# Patient Record
Sex: MALE | Race: WHITE | Employment: FULL TIME | URBAN - METROPOLITAN AREA
[De-identification: names, ages, dates, MRNs, and addresses within clinical notes are randomized per-mention and may not be internally consistent; named-entity substitution may affect disease eponyms.]

---

## 2017-10-03 LAB
CHOLESTEROL, TOTAL: 200 MG/DL
CHOLESTEROL/HDL RATIO: ABNORMAL
HDLC SERPL-MCNC: 75 MG/DL (ref 35–70)
LDL CHOLESTEROL CALCULATED: 65 MG/DL (ref 0–160)
NONHDLC SERPL-MCNC: ABNORMAL MG/DL
TRIGL SERPL-MCNC: 205 MG/DL
VLDLC SERPL CALC-MCNC: ABNORMAL MG/DL

## 2020-06-08 PROBLEM — Z79.4 TYPE 2 DIABETES MELLITUS WITH DIABETIC POLYNEUROPATHY, WITH LONG-TERM CURRENT USE OF INSULIN (HCC): Status: ACTIVE | Noted: 2020-06-08

## 2021-01-06 PROBLEM — R53.83 OTHER FATIGUE: Status: ACTIVE | Noted: 2021-01-06

## 2022-04-21 PROBLEM — R26.2 DIFFICULTY WALKING: Status: ACTIVE | Noted: 2022-04-21

## 2023-03-13 ENCOUNTER — PROCEDURE VISIT (OUTPATIENT)
Age: 63
End: 2023-03-13

## 2023-03-13 DIAGNOSIS — F41.9 ANXIETY: Primary | ICD-10-CM

## 2023-03-15 ENCOUNTER — CLINICAL DOCUMENTATION (OUTPATIENT)
Age: 63
End: 2023-03-15

## 2023-03-16 ENCOUNTER — PROCEDURE VISIT (OUTPATIENT)
Age: 63
End: 2023-03-16

## 2023-03-16 DIAGNOSIS — I10 ESSENTIAL HYPERTENSION: Primary | ICD-10-CM

## 2023-03-16 DIAGNOSIS — E10.9 TYPE 1 DIABETES MELLITUS WITHOUT COMPLICATION (HCC): Primary | ICD-10-CM

## 2023-04-14 ENCOUNTER — PROCEDURE VISIT (OUTPATIENT)
Age: 63
End: 2023-04-14

## 2023-04-14 DIAGNOSIS — E11.9 TYPE 2 DIABETES MELLITUS WITHOUT COMPLICATION, WITH LONG-TERM CURRENT USE OF INSULIN (HCC): Primary | ICD-10-CM

## 2023-04-14 DIAGNOSIS — Z79.4 TYPE 2 DIABETES MELLITUS WITHOUT COMPLICATION, WITH LONG-TERM CURRENT USE OF INSULIN (HCC): Primary | ICD-10-CM

## 2023-04-28 ENCOUNTER — PROCEDURE VISIT (OUTPATIENT)
Age: 63
End: 2023-04-28

## 2023-04-28 DIAGNOSIS — F02.A18 MILD EARLY ONSET ALZHEIMER'S DEMENTIA WITH OTHER BEHAVIORAL DISTURBANCE (HCC): Primary | ICD-10-CM

## 2023-04-28 DIAGNOSIS — G30.0 MILD EARLY ONSET ALZHEIMER'S DEMENTIA WITH OTHER BEHAVIORAL DISTURBANCE (HCC): Primary | ICD-10-CM

## 2023-04-28 DIAGNOSIS — F41.9 ANXIETY: ICD-10-CM

## 2023-05-02 ENCOUNTER — PROCEDURE VISIT (OUTPATIENT)
Age: 63
End: 2023-05-02

## 2023-05-03 ENCOUNTER — CARE COORDINATION (OUTPATIENT)
Dept: CARE COORDINATION | Facility: CLINIC | Age: 63
End: 2023-05-03

## 2023-05-03 DIAGNOSIS — I50.41 ACUTE COMBINED SYSTOLIC AND DIASTOLIC CONGESTIVE HEART FAILURE (HCC): Primary | ICD-10-CM

## 2023-05-03 DIAGNOSIS — R53.83 OTHER FATIGUE: Primary | ICD-10-CM

## 2023-05-03 NOTE — CARE COORDINATION
Remote Patient Monitoring Welcome Note  Date/Time:  5/3/2023 4:27 PM  Patient Current Location: Home: This Is An [place] Test [place]  [id number] [address] [address]  Verified patients name and  as identifiers. Completed and confirmed the following:   Emergency Contact: Javy daughter  [x] Patient received all RPM equipment (tablet, scale, blood pressure device and cuff, and pulse oximeter)  Cuff Size: regular (9.05\"-15.74\")    Weight Scale:  regular (<330lbs)                    [x] Instructed patient keep box for use when returning equipment                                                          [x] Reviewed Patient Welcome Letter with patient                         [x] Reviewed expectations for patient and care team  Monitoring hours M-F 9-4pm  Completing monitoring by 12pm on  so that alerts can be responded to in the same day  Patient weighs self at same time every day (or after urinating and waking up)  Take blood pressure 1-2 hrs after medications   RPM team may have different phone area code (including [place], [place], [facility] [address] or [address])                              [x] Instructed patient to keep scale on flat surface                                                         [x] Instructed patient to keep tablet plugged in at all times                         [x] Instructed how to contact IT support ([phone])  [x] Provided Remote Patient Monitoring care  information               All questions answered at this time.

## 2023-05-08 PROBLEM — E11.9 DIABETES (HCC): Status: ACTIVE | Noted: 2017-02-17

## 2023-05-08 NOTE — PROGRESS NOTES
Erwin Perez (: 1960) is a 65 y.o. male, Established patient patient, Virtual Visit for evaluation of the following chief complaint(s):   No chief complaint on file. ASSESSMENT/PLAN:  1. Mild early onset Alzheimer's dementia with other behavioral disturbance ([facility] [address]  2. Anxiety    Return in about 4 weeks (around 2023) for follow up F2F in 4 weeks. SUBJECTIVE/OBJECTIVE:    Review of Systems     Patient-Reported Vitals  No data recorded     Physical Exam    On this date 2023 I have spent 1 minutes reviewing previous notes, test results and face to face (virtual) with the patient discussing the diagnosis and importance of compliance with the treatment plan as well as documenting on the day of the visit. Erwin Perez, was evaluated through a synchronous (real-time) audio-video encounter. The patient (or guardian if applicable) is aware that this is a billable service, which includes applicable co-pays. This Virtual Visit was conducted with patient's (and/or legal guardian's) consent. Patient identification was verified, and a caregiver was present when appropriate. The patient was located at Home: This Is An Epic Test [place]  [address] [address] [facility]  Provider was located at Home ([facility]): [place]       Patient identification was verified at the start of the visit: yes    Services were provided through a phone synchronous discussion virtually to substitute for in-person clinic visit. Patient was located at home and provider was located in office or at home. An electronic signature was used to authenticate this note.   -- Sonya Christian MD

## 2023-05-18 ENCOUNTER — PROCEDURE VISIT (OUTPATIENT)
Age: 63
End: 2023-05-18

## 2023-05-18 DIAGNOSIS — Z02.0 SCHOOL PHYSICAL EXAM: Primary | ICD-10-CM

## 2023-07-03 PROBLEM — R26.2 DIFFICULTY WALKING: Status: RESOLVED | Noted: 2022-04-21 | Resolved: 2023-07-03

## 2023-11-23 LAB
COLONOSCOPY, EXTERNAL: NORMAL
HBA1C MFR BLD HPLC: 5.8 %

## 2023-11-30 PROBLEM — R05.3 CHRONIC COUGH: Status: ACTIVE | Noted: 2023-11-30

## 2024-01-26 ENCOUNTER — PROCEDURE VISIT (OUTPATIENT)
Dept: PRIMARY CARE CLINIC | Age: 64
End: 2024-01-26

## 2024-01-26 DIAGNOSIS — R33.8 ACUTE URINARY RETENTION: Primary | ICD-10-CM

## 2024-02-12 ENCOUNTER — TELEPHONE (OUTPATIENT)
Dept: ADMINISTRATIVE | Age: 64
End: 2024-02-12

## 2024-02-26 ENCOUNTER — HOSPITAL ENCOUNTER (OUTPATIENT)
Dept: GENERAL RADIOLOGY | Age: 64
Discharge: HOME | End: 2024-02-26
Attending: INTERNAL MEDICINE

## 2024-02-26 DIAGNOSIS — R07.9 CHEST PAIN, UNSPECIFIED TYPE: ICD-10-CM

## 2024-02-27 ENCOUNTER — HOSPITAL ENCOUNTER (OUTPATIENT)
Dept: GENERAL RADIOLOGY | Age: 64
Discharge: HOME | End: 2024-02-27
Attending: INTERNAL MEDICINE

## 2024-02-27 DIAGNOSIS — R10.84 GENERALIZED ABDOMINAL PAIN: ICD-10-CM

## 2024-02-28 ENCOUNTER — PROCEDURE VISIT (OUTPATIENT)
Dept: PRIMARY CARE CLINIC | Age: 64
End: 2024-02-28

## 2024-02-28 DIAGNOSIS — R06.02 SHORTNESS OF BREATH: Primary | ICD-10-CM

## 2024-03-06 DIAGNOSIS — Z02.0 SCHOOL PHYSICAL EXAM: ICD-10-CM

## 2024-03-06 DIAGNOSIS — Z02.0 SCHOOL PHYSICAL EXAM: Primary | ICD-10-CM

## 2024-05-30 ENCOUNTER — HOSPITAL ENCOUNTER (OUTPATIENT)
Age: 64
Discharge: HOME | End: 2024-06-01

## 2024-05-30 DIAGNOSIS — R07.1 CHEST PAIN ON BREATHING: ICD-10-CM
